# Patient Record
Sex: MALE | Race: WHITE
[De-identification: names, ages, dates, MRNs, and addresses within clinical notes are randomized per-mention and may not be internally consistent; named-entity substitution may affect disease eponyms.]

---

## 2019-01-07 ENCOUNTER — HOSPITAL ENCOUNTER (EMERGENCY)
Age: 29
Discharge: HOME | End: 2019-01-07
Payer: SELF-PAY
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
## 2019-01-07 DIAGNOSIS — S60.221A: ICD-10-CM

## 2019-01-07 DIAGNOSIS — S60.041A: ICD-10-CM
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
## 2019-01-07 DIAGNOSIS — S60.031A: Primary | ICD-10-CM

## 2020-01-06 ENCOUNTER — OFFICE VISIT (OUTPATIENT)
Dept: FAMILY MEDICINE CLINIC | Age: 30
End: 2020-01-06

## 2020-01-06 VITALS
OXYGEN SATURATION: 97 % | WEIGHT: 161 LBS | HEART RATE: 90 BPM | SYSTOLIC BLOOD PRESSURE: 104 MMHG | DIASTOLIC BLOOD PRESSURE: 62 MMHG | TEMPERATURE: 98.5 F | HEIGHT: 72 IN | BODY MASS INDEX: 21.81 KG/M2

## 2020-01-06 LAB
INFLUENZA A ANTIBODY: NORMAL
INFLUENZA B ANTIBODY: NORMAL

## 2020-01-06 PROCEDURE — 87804 INFLUENZA ASSAY W/OPTIC: CPT | Performed by: PHYSICIAN ASSISTANT

## 2020-01-06 PROCEDURE — 99213 OFFICE O/P EST LOW 20 MIN: CPT | Performed by: PHYSICIAN ASSISTANT

## 2020-01-06 RX ORDER — ONDANSETRON 4 MG/1
4 TABLET, FILM COATED ORAL EVERY 4 HOURS PRN
Qty: 12 TABLET | Refills: 0 | Status: SHIPPED
Start: 2020-01-06 | End: 2021-03-30 | Stop reason: ALTCHOICE

## 2020-01-06 RX ORDER — NAPROXEN 500 MG/1
500 TABLET ORAL 2 TIMES DAILY WITH MEALS
Qty: 20 TABLET | Refills: 0 | Status: SHIPPED
Start: 2020-01-06 | End: 2021-03-30 | Stop reason: ALTCHOICE

## 2020-01-06 RX ORDER — BROMPHENIRAMINE MALEATE, PSEUDOEPHEDRINE HYDROCHLORIDE, AND DEXTROMETHORPHAN HYDROBROMIDE 2; 30; 10 MG/5ML; MG/5ML; MG/5ML
5 SYRUP ORAL 4 TIMES DAILY PRN
Qty: 120 ML | Refills: 0 | Status: SHIPPED | OUTPATIENT
Start: 2020-01-06 | End: 2020-01-13

## 2021-03-30 ENCOUNTER — OFFICE VISIT (OUTPATIENT)
Dept: FAMILY MEDICINE CLINIC | Age: 31
End: 2021-03-30

## 2021-03-30 VITALS
BODY MASS INDEX: 20.67 KG/M2 | DIASTOLIC BLOOD PRESSURE: 76 MMHG | TEMPERATURE: 97.5 F | HEART RATE: 80 BPM | RESPIRATION RATE: 18 BRPM | OXYGEN SATURATION: 98 % | HEIGHT: 73 IN | WEIGHT: 156 LBS | SYSTOLIC BLOOD PRESSURE: 120 MMHG

## 2021-03-30 DIAGNOSIS — R19.7 DIARRHEA, UNSPECIFIED TYPE: Primary | ICD-10-CM

## 2021-03-30 PROCEDURE — 99213 OFFICE O/P EST LOW 20 MIN: CPT | Performed by: PHYSICIAN ASSISTANT

## 2021-03-30 SDOH — HEALTH STABILITY: MENTAL HEALTH: HOW OFTEN DO YOU HAVE A DRINK CONTAINING ALCOHOL?: NEVER

## 2021-03-30 NOTE — LETTER
[facility]  [address] [place] [address]  Phone: [phone]  Fax: [phone] [place], [place]        March 30, 2021     Patient: Stephani Nunes   YOB: 1990   Date of Visit: 3/30/2021       To Whom It May Concern: It is my medical opinion that Stephani Nunes may return to work on 3/30/2021. If you have any questions or concerns, please don't hesitate to call.     Sincerely,        AJ Gomez III

## 2021-03-30 NOTE — PROGRESS NOTES
3/30/21  Ozzy Merino : 1990 Sex: male  Age 33 y.o. Subjective:  Chief Complaint   Patient presents with    Diarrhea     for past two days          HPI:   Ozzy Merino , 33 y.o. male presents to express care for evaluation ofdiarrhea. The patient has had multiple episodes of diarrhea over the last 24 hours essentially. Seems to be resolving at this point. The patient is not having any significant pains other than some cramping when the diarrhea occurs. He has not noted any blood in the urine. The patient has not noted any hematochezia or melena. No nausea or vomiting. He has been trying to consume fluids but it does seem to make his stomach a little bit queasy. The patient is not noted any other sick contacts around him. He may have had some bad food. Does eat fast food quite a bit. ROS:   Unless otherwise stated in this report the patient's positive and negative responses for review of systems for constitutional, eyes, ENT, cardiovascular, respiratory, gastrointestinal, neurological, , musculoskeletal, and integument systems and related systems to the presenting problem are either stated in the history of present illness or were not pertinent or were negative for the symptoms and/or complaints related to the presenting medical problem. Positives and pertinent negatives as per HPI. All others reviewed and are negative. PMH:   History reviewed. No pertinent past medical history. History reviewed. No pertinent surgical history. History reviewed. No pertinent family history. Medications:   No current outpatient medications on file. Allergies:   No Known Allergies    Social History:     Social History     Tobacco Use    Smoking status: Never Smoker    Smokeless tobacco: Never Used    Tobacco comment: vape   Substance Use Topics    Alcohol use: Never     Frequency: Never     Binge frequency: Never    Drug use: Never       Patient lives at home.     Physical Exam:     Vitals:    03/30/21 1438   BP: 120/76   Pulse: 80   Resp: 18   Temp: 97.5 °F (36.4 °C)   SpO2: 98%   Weight: 156 lb (70.8 kg)   Height: 6' 1\" (1.854 m)       Exam:  Physical Exam  Nurses note and vital signs reviewed and patient is not hypoxic. General: The patient appears well and in no apparent distress. Patient is resting comfortably on cart. Skin: Warm, dry, no pallor noted. There is no rash noted. Head: Normocephalic, atraumatic. Eye: Normal conjunctiva  Ears, Nose, Mouth, and Throat: Mildly dry mucous membranes  Cardiovascular: Regular Rate and Rhythm  Respiratory: Patient is in no distress, no accessory muscle use, lungs are clear to auscultation, no wheezing, crackles or rhonchi  Back: Non-tender, no CVA tenderness bilaterally to percussion. GI: Normal bowel sounds, no tenderness to palpation, no masses appreciated. No rebound, guarding, or rigidity noted. Musculoskeletal: The patient has no evidence of calf tenderness, no pitting edema, symmetrical pulses noted bilaterally  Neurological: A&O x4, normal speech        Testing:           Medical Decision Making:     Vital signs reviewed    Past medical history reviewed. Allergies reviewed. Medications reviewed. Patient on arrival does not appear to be in any apparent distress or discomfort. The patient has been seen and evaluated. The patient does not appear to be toxic or lethargic. The patient is actually feeling better at this time. He needed a note to return to work tonight. The patient will continue to push fluids as much as he can. I would eat a very bland diet or even start brat diet. The patient understands plan has no other questions or concerns at this time. The patient is to return to express care or go directly to the emergency department should any of the signs or symptoms worsen. The patient is to followup with primary care physician in 2-3 days for repeat evaluation.  The patient has no other questions or concerns at this time the patient will be discharged home. Clinical Impression:   Kaci Montalvo was seen today for diarrhea. Diagnoses and all orders for this visit:    Diarrhea, unspecified type        The patient is to call for any concerns or return if any of the signs or symptoms worsen. The patient is to follow-up with PCP in the next 2-3 days for repeat evaluation repeat assessment or go directly to the emergency department.      SIGNATURE: Christie Lehman III, PA-C

## 2022-12-09 ENCOUNTER — HOSPITAL ENCOUNTER (EMERGENCY)
Dept: HOSPITAL 83 - ED | Age: 32
LOS: 1 days | Discharge: HOME | End: 2022-12-10
Payer: COMMERCIAL

## 2022-12-09 VITALS — WEIGHT: 182 LBS | HEIGHT: 71.97 IN | BODY MASS INDEX: 24.65 KG/M2

## 2022-12-09 DIAGNOSIS — N20.1: Primary | ICD-10-CM

## 2022-12-09 LAB
BACTERIA #/AREA URNS HPF: (no result) /[HPF]
EPI CELLS #/AREA URNS HPF: (no result) /[HPF]
HGB UR QL STRIP: (no result)
MUCOUS THREADS URNS QL MICRO: (no result)
PH UR STRIP: 6.5 [PH] (ref 4.5–8)
RBC #/AREA URNS HPF: (no result) RBC/HPF (ref 0–2)
SP GR UR: 1.02 (ref 1–1.03)
UROBILINOGEN UR STRIP-MCNC: 1 E.U./DL (ref 0–1)
WBC #/AREA URNS HPF: (no result) WBC/HPF (ref 0–5)